# Patient Record
Sex: FEMALE | Race: WHITE | URBAN - NONMETROPOLITAN AREA
[De-identification: names, ages, dates, MRNs, and addresses within clinical notes are randomized per-mention and may not be internally consistent; named-entity substitution may affect disease eponyms.]

---

## 2018-02-28 ENCOUNTER — DOCUMENTATION ONLY (OUTPATIENT)
Dept: FAMILY MEDICINE | Facility: OTHER | Age: 48
End: 2018-02-28

## 2018-02-28 RX ORDER — SUMATRIPTAN 50 MG/1
TABLET, FILM COATED ORAL
COMMUNITY
Start: 2016-10-07

## 2018-02-28 RX ORDER — IBUPROFEN 200 MG
200 TABLET ORAL 4 TIMES DAILY PRN
COMMUNITY

## 2018-02-28 RX ORDER — TRIAMCINOLONE ACETONIDE 1 MG/G
CREAM TOPICAL 3 TIMES DAILY
COMMUNITY
Start: 2016-12-26

## 2018-02-28 RX ORDER — LEVOTHYROXINE SODIUM 75 UG/1
TABLET ORAL
COMMUNITY
Start: 2016-12-02

## 2019-06-10 ENCOUNTER — OFFICE VISIT (OUTPATIENT)
Dept: FAMILY MEDICINE | Facility: OTHER | Age: 49
End: 2019-06-10
Attending: NURSE PRACTITIONER
Payer: COMMERCIAL

## 2019-06-10 VITALS
WEIGHT: 187.38 LBS | RESPIRATION RATE: 15 BRPM | DIASTOLIC BLOOD PRESSURE: 70 MMHG | SYSTOLIC BLOOD PRESSURE: 112 MMHG | HEIGHT: 66 IN | HEART RATE: 80 BPM | BODY MASS INDEX: 30.11 KG/M2 | TEMPERATURE: 97.5 F
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
## 2019-06-10 PROCEDURE — 90471 IMMUNIZATION ADMIN: CPT | Performed by: NURSE PRACTITIONER

## 2019-06-10 PROCEDURE — 90715 TDAP VACCINE 7 YRS/> IM: CPT | Performed by: NURSE PRACTITIONER

## 2019-06-10 PROCEDURE — 99213 OFFICE O/P EST LOW 20 MIN: CPT | Mod: 25 | Performed by: NURSE PRACTITIONER

## 2019-06-10 SDOH — HEALTH STABILITY: MENTAL HEALTH: HOW OFTEN DO YOU HAVE A DRINK CONTAINING ALCOHOL?: MONTHLY OR LESS

## 2019-06-10 ASSESSMENT — PAIN SCALES - GENERAL: PAINLEVEL: MILD PAIN (2)

## 2019-06-10 ASSESSMENT — MIFFLIN-ST. JEOR: SCORE: 1506.68

## 2019-06-10 NOTE — PATIENT INSTRUCTIONS
Patient Education     Cat Bite    A cat bite can cause a wound deep enough to break the skin. In such cases, the wound is cleaned and then sometimes closed. If the wound is closed it is usually not closed completely. This is so that fluid can drain if the wound becomes infected. Often the wound is left open to heal. In addition to wound care, a tetanus shot may be given, if needed.  Home care    Wash your hands well with soap and warm water before and after caring for the wound. This helps lower the risk of infection.    Care for the wound as directed. If a dressing was applied to the wound, be sure to change it as directed.    If the wound bleeds, place a clean, soft cloth on the wound. Then firmly apply pressure until the bleeding stops. This may take up to 5 minutes. Don't release the pressure and look at the wound during this time.    Always get medical attention for cat bites on the hand. They are highly likely to become infected.    Most wounds heal within 10 days. But an infection can occur even with proper treatment. So be sure to check the wound daily for signs of infection (see below).    Antibiotics may be prescribed. These help prevent or treat infection. If you re given antibiotics, take them as directed. Also be sure to complete the medicines.  Rabies prevention  Rabies is a virus that can be carried in certain animals. These can include domestic animals such as cats and dogs. Pets fully vaccinated against rabies (2 shots) are at very low risk of infection. But because human rabies is almost always fatal, any biting pet should be confined for 10 days as an extra precaution. In general, if there is a risk for rabies, the following steps may need to be taken:    If someone s pet cat has bitten you, it should be kept in a secure area for the next 10 days to watch for signs of illness. If the pet owner won t allow this, contact your local animal control center. If the cat becomes ill or dies during that  time, contact your local animal control center at once so the animal may be tested for rabies. If the cat stays healthy for the next 10 days, there is no danger of rabies in the animal or you.    If a stray cat bit you, contact your local animal control center. They can give information on capture, quarantine, and animal rabies testing.    If you can t find the animal that bit you in the next 2 days, and if rabies exists in your area, you may need to receive the rabies vaccine series. Call your healthcare provider right away. Or return to the emergency department promptly.    All animal bites should be reported to the local animal control center. If you were not given a form to fill out, you can report this yourself.  Follow-up care  Follow up with your healthcare provider, or as directed.  When to seek medical advice  Call your healthcare provider right away if any of these occur:    Signs of infection:  ? Spreading redness or warmth from the wound  ? Increased pain or swelling  ? Fever of 100.4 F (38 C) or higher, or as directed by your healthcare provider  ? Colored fluid or pus draining from the wound  ? Enlarged lymph nodes above the area that was bitten, such as lymph nodes in the armpit if you were bitten on the hand or arm. This may be a sign of cat-scratch disease (cat-scratch fever).    Signs of rabies infection:  ? Headache  ? Confusion  ? Strange behavior  ? Increased salivating or drooling  ? Seizure    Decreased ability to move any body part near the bite area    Bleeding that can't be stopped after 5 minutes of firm pressure  Date Last Reviewed: 5/1/2018 2000-2018 [employer]. [address] [place] [address], [place] [address]. All rights reserved. This information is not intended as a substitute for professional medical care. Always follow your healthcare professional's instructions.

## 2019-06-10 NOTE — NURSING NOTE
Prior to injection, verified patient identity using patient's name and date of birth.  Due to injection administration, patient instructed to remain in clinic for 15 minutes  afterwards, and to report any adverse reaction to me immediately.    Boostrix    Drug Amount Wasted:  None.  Vial/Syringe: Single dose vial  Expiration Date:  2021

## 2019-06-10 NOTE — PROGRESS NOTES
"HPI:    Tabitha Martinez is a 48 year old female who presents to clinic today for cat bite to left hand.  She reports this happened yesterday morning around 11 AM.  She was out in her yard and a barn cat that was done to the house approximately 3 weeks ago was following her into a shed that she did not with a cat to be into.  She went to  the cat and it bit her in the right hand just at the base of her thumb.  She states the area bled significantly when she was first bit.  She did wash it with soap and water.  Throughout the day of began to become more painful, swollen and erythematous.  They are not sure immunization status of the cat but they do still have it and are able to observe this.    Past Medical History:   Diagnosis Date     History of other disorders of nervous system and sense organs     No Comments Provided     Hypothyroidism     borderline     Personal history of other medical treatment (CODE)     2003         Current Outpatient Medications   Medication Sig Dispense Refill     amoxicillin-clavulanate (AUGMENTIN) 875-125 MG tablet Take 1 tablet by mouth 2 times daily for 10 days 20 tablet 0     cholecalciferol (VITAMIN D-1000 MAX ST) 1000 UNITS TABS Take 2,000 Units by mouth daily       ibuprofen (ADVIL/MOTRIN) 200 MG tablet Take 200 mg by mouth 4 times daily as needed       levothyroxine (SYNTHROID/LEVOTHROID) 75 MCG tablet        metFORMIN (GLUCOPHAGE) 500 MG tablet Take 1 tablet (500 mg) by mouth daily (with breakfast)       SUMAtriptan (IMITREX) 50 MG tablet        triamcinolone (KENALOG) 0.1 % cream Apply topically 3 times daily         Allergies   Allergen Reactions     Erythromycin Unknown       ROS:  Pertinent positives and negatives are noted in HPI.    EXAM:  /70 (BP Location: Right arm, Patient Position: Sitting, Cuff Size: Adult Regular)   Pulse 80   Temp 97.5  F (36.4  C) (Tympanic)   Resp 15   Ht 1.676 m (5' 6\")   Wt 85 kg (187 lb 6 oz)   Breastfeeding? No   BMI " 30.24 kg/m    General appearance: well appearing female, in no acute distress  Respiratory: clear to auscultation bilaterally  Cardiac: RRR with no murmurs  Musculoskeletal: Slightly limited range of motion to right thumb due to swelling at base  Dermatological: Base of right thumb with swelling and erythema.  She does have 2 puncture wounds on the palmar side of her hand as well as 2 puncture wounds to the dorsal side of her hand.  No drainage is noted.  Psychological: normal affect, alert and pleasant    ASSESSMENT AND PLAN:    1. Cat bite, initial encounter      Cat bite to left hand with cellulitis noted.  Tetanus was updated today.  We will treat her with Augmentin twice daily for 10 days.  I did outline the erythema on her hand for her to watch for any changes.  She needs to have very low threshold for return to clinic with any changes.  She is aware of this and agrees.  She will continue to watch the cat over the next 10 days for any changes as well.  I have asked her to follow-up with me on Thursday, sooner if needed.      Ileana Mortensen..................6/10/2019 8:51 AM      This document was prepared using voice generated software.  While every attempt was made for accuracy, grammatical errors may exist.

## 2019-06-10 NOTE — NURSING NOTE
Patient presents to clinic today for cat bite on left hand. He is a stray cat that she has been letting live in her barn. Bite happened yesterday.    No LMP recorded.  Medication Reconciliation: complete    Juana Carr LPN  6/10/2019 8:53 AM